# Patient Record
Sex: FEMALE | Race: WHITE
[De-identification: names, ages, dates, MRNs, and addresses within clinical notes are randomized per-mention and may not be internally consistent; named-entity substitution may affect disease eponyms.]

---

## 2017-11-02 ENCOUNTER — RX ONLY (RX ONLY)
Age: 42
End: 2017-11-02

## 2017-11-02 ENCOUNTER — DOCTOR'S OFFICE (OUTPATIENT)
Dept: URBAN - METROPOLITAN AREA CLINIC 125 | Facility: CLINIC | Age: 42
Setting detail: OPHTHALMOLOGY
End: 2017-11-02
Payer: COMMERCIAL

## 2017-11-02 DIAGNOSIS — H10.11: ICD-10-CM

## 2017-11-02 DIAGNOSIS — S05.01XA: ICD-10-CM

## 2017-11-02 PROCEDURE — 92002 INTRM OPH EXAM NEW PATIENT: CPT | Performed by: OPTOMETRIST

## 2017-11-02 ASSESSMENT — REFRACTION_MANIFEST
OD_VA1: 20/
OD_VA3: 20/
OS_VA2: 20/
OS_VA2: 20/
OD_VA3: 20/
OU_VA: 20/
OD_VA2: 20/
OS_VA2: 20/
OD_VA1: 20/
OD_VA1: 20/
OU_VA: 20/
OS_VA1: 20/
OD_VA2: 20/
OS_VA1: 20/
OD_VA3: 20/
OS_VA1: 20/
OS_VA3: 20/
OS_VA3: 20/
OD_VA2: 20/
OU_VA: 20/
OS_VA3: 20/

## 2017-11-02 ASSESSMENT — REFRACTION_CURRENTRX
OS_OVR_VA: 20/
OD_OVR_VA: 20/
OS_SPHERE: -5.75
OS_OVR_VA: 20/
OD_OVR_VA: 20/
OD_VPRISM_DIRECTION: SV
OD_SPHERE: -5.25
OS_AXIS: 074
OS_VPRISM_DIRECTION: SV
OD_CYLINDER: -1.00
OS_OVR_VA: 20/
OD_OVR_VA: 20/
OD_AXIS: 092
OS_CYLINDER: -0.75

## 2017-11-02 ASSESSMENT — VISUAL ACUITY
OD_BCVA: 20/20
OS_BCVA: 20/60+2

## 2017-11-07 ENCOUNTER — DOCTOR'S OFFICE (OUTPATIENT)
Dept: URBAN - METROPOLITAN AREA CLINIC 125 | Facility: CLINIC | Age: 42
Setting detail: OPHTHALMOLOGY
End: 2017-11-07
Payer: COMMERCIAL

## 2017-11-07 DIAGNOSIS — S05.01XD: ICD-10-CM

## 2017-11-07 DIAGNOSIS — H10.11: ICD-10-CM

## 2017-11-07 PROCEDURE — 92012 INTRM OPH EXAM EST PATIENT: CPT | Performed by: OPTOMETRIST

## 2017-11-07 ASSESSMENT — REFRACTION_CURRENTRX
OD_OVR_VA: 20/
OS_OVR_VA: 20/
OS_SPHERE: -5.75
OD_OVR_VA: 20/
OS_OVR_VA: 20/
OS_VPRISM_DIRECTION: SV
OS_AXIS: 074
OD_CYLINDER: -1.00
OS_OVR_VA: 20/
OD_AXIS: 092
OS_CYLINDER: -0.75
OD_VPRISM_DIRECTION: SV
OD_SPHERE: -5.25
OD_OVR_VA: 20/

## 2017-11-07 ASSESSMENT — REFRACTION_MANIFEST
OS_VA1: 20/
OD_VA2: 20/
OD_VA2: 20/
OD_VA1: 20/
OS_VA3: 20/
OU_VA: 20/
OS_VA3: 20/
OU_VA: 20/
OD_VA2: 20/
OD_VA3: 20/
OS_VA3: 20/
OS_VA1: 20/
OD_VA1: 20/
OS_VA2: 20/
OU_VA: 20/
OD_VA3: 20/
OS_VA1: 20/
OS_VA2: 20/
OD_VA3: 20/
OD_VA1: 20/
OS_VA2: 20/

## 2017-11-07 ASSESSMENT — VISUAL ACUITY
OS_BCVA: 20/25-2
OD_BCVA: 20/25+1

## 2017-11-07 ASSESSMENT — DRY EYES - PHYSICIAN NOTES
OS_GENERALCOMMENTS: ETFBUT 5 SEC
OD_GENERALCOMMENTS: ETFBUT 5 SEC

## 2018-07-13 ENCOUNTER — RX ONLY (RX ONLY)
Age: 43
End: 2018-07-13

## 2018-07-13 ENCOUNTER — DOCTOR'S OFFICE (OUTPATIENT)
Dept: URBAN - METROPOLITAN AREA CLINIC 125 | Facility: CLINIC | Age: 43
Setting detail: OPHTHALMOLOGY
End: 2018-07-13
Payer: COMMERCIAL

## 2018-07-13 DIAGNOSIS — H52.13: ICD-10-CM

## 2018-07-13 PROBLEM — S05.01XD CORNEAL ABRASION; RIGHT EYE SUBSEQUENT ENCOUNTER: Status: RESOLVED | Noted: 2017-11-07 | Resolved: 2018-07-13

## 2018-07-13 PROBLEM — H10.11 ALLERGIC CONJUNCTIVITIS; RIGHT EYE: Status: RESOLVED | Noted: 2017-11-02 | Resolved: 2018-07-13

## 2018-07-13 PROCEDURE — 92014 COMPRE OPH EXAM EST PT 1/>: CPT | Performed by: OPTOMETRIST

## 2018-07-13 PROCEDURE — 92015 DETERMINE REFRACTIVE STATE: CPT | Performed by: OPTOMETRIST

## 2018-07-13 ASSESSMENT — REFRACTION_CURRENTRX
OS_OVR_VA: 20/
OS_VPRISM_DIRECTION: SV
OD_OVR_VA: 20/
OS_CYLINDER: -0.75
OS_AXIS: 074
OS_OVR_VA: 20/
OD_CYLINDER: -1.00
OD_OVR_VA: 20/
OD_OVR_VA: 20/
OS_SPHERE: -5.75
OD_SPHERE: -5.25
OS_OVR_VA: 20/
OD_AXIS: 092
OD_VPRISM_DIRECTION: SV

## 2018-07-13 ASSESSMENT — REFRACTION_OUTSIDERX
OD_VA2: 20/
OD_VA1: 20/20
OD_AXIS: 090
OD_VA3: 20/
OS_VA3: 20/
OS_CYLINDER: -0.75
OS_AXIS: 075
OU_VA: 20/20
OD_CYLINDER: -0.25
OS_VA2: 20/
OS_SPHERE: -6.00
OD_SPHERE: -6.00
OS_VA1: 20/20

## 2018-07-13 ASSESSMENT — REFRACTION_AUTOREFRACTION
OS_AXIS: 066
OD_SPHERE: -5.25
OS_SPHERE: -5.25
OD_CYLINDER: 0.00
OD_AXIS: 180
OS_CYLINDER: -0.25

## 2018-07-13 ASSESSMENT — REFRACTION_MANIFEST
OS_VA3: 20/
OS_VA1: 20/
OS_VA1: 20/
OD_VA3: 20/
OS_VA2: 20/
OD_VA1: 20/
OS_VA3: 20/
OU_VA: 20/
OD_VA2: 20/
OS_VA2: 20/
OD_VA2: 20/
OD_VA3: 20/
OU_VA: 20/
OD_VA1: 20/

## 2018-07-13 ASSESSMENT — AXIALLENGTH_DERIVED
OS_AL: 25.0345
OD_AL: 25.0314

## 2018-07-13 ASSESSMENT — CONFRONTATIONAL VISUAL FIELD TEST (CVF)
OS_FINDINGS: FULL
OD_FINDINGS: FULL

## 2018-07-13 ASSESSMENT — SPHEQUIV_DERIVED
OD_SPHEQUIV: -5.25
OS_SPHEQUIV: -5.375

## 2018-07-13 ASSESSMENT — VISUAL ACUITY
OS_BCVA: 20/20
OD_BCVA: 20/20

## 2018-07-13 ASSESSMENT — KERATOMETRY
OD_AXISANGLE_DEGREES: 014
OD_K2POWER_DIOPTERS: 46.00
OD_K1POWER_DIOPTERS: 44.75
OS_AXISANGLE_DEGREES: 163
OS_K2POWER_DIOPTERS: 45.75
OS_K1POWER_DIOPTERS: 45.25

## 2018-07-13 ASSESSMENT — DRY EYES - PHYSICIAN NOTES
OS_GENERALCOMMENTS: ETFBUT 5 SEC
OD_GENERALCOMMENTS: ETFBUT 5 SEC

## 2021-07-12 ENCOUNTER — DOCTOR'S OFFICE (OUTPATIENT)
Dept: URBAN - METROPOLITAN AREA CLINIC 125 | Facility: CLINIC | Age: 46
Setting detail: OPHTHALMOLOGY
End: 2021-07-12
Payer: COMMERCIAL

## 2021-07-12 VITALS — HEIGHT: 55 IN

## 2021-07-12 DIAGNOSIS — H25.13: ICD-10-CM

## 2021-07-12 DIAGNOSIS — H35.372: ICD-10-CM

## 2021-07-12 DIAGNOSIS — H02.011: ICD-10-CM

## 2021-07-12 DIAGNOSIS — H04.123: ICD-10-CM

## 2021-07-12 DIAGNOSIS — H53.123: ICD-10-CM

## 2021-07-12 PROCEDURE — 67820 REVISE EYELASHES: CPT | Performed by: OPHTHALMOLOGY

## 2021-07-12 PROCEDURE — 92134 CPTRZ OPH DX IMG PST SGM RTA: CPT | Performed by: OPHTHALMOLOGY

## 2021-07-12 PROCEDURE — 92014 COMPRE OPH EXAM EST PT 1/>: CPT | Performed by: OPHTHALMOLOGY

## 2021-07-12 ASSESSMENT — REFRACTION_CURRENTRX
OS_SPHERE: -5.75
OS_AXIS: 074
OD_AXIS: 092
OS_CYLINDER: -0.75
OS_OVR_VA: 20/
OD_OVR_VA: 20/
OD_CYLINDER: -1.00
OD_SPHERE: -5.25
OS_VPRISM_DIRECTION: SV
OD_VPRISM_DIRECTION: SV

## 2021-07-12 ASSESSMENT — REFRACTION_MANIFEST
OD_CYLINDER: -0.25
OS_AXIS: 075
OD_SPHERE: -6.00
OS_CYLINDER: -0.75
OS_VA1: 20/20
OD_AXIS: 090
OS_SPHERE: -6.00
OD_VA1: 20/20
OU_VA: 20/20

## 2021-07-12 ASSESSMENT — KERATOMETRY
OS_K1POWER_DIOPTERS: 46.00
OS_K2POWER_DIOPTERS: 68.25
OD_K2POWER_DIOPTERS: 46.00
OD_AXISANGLE_DEGREES: 010
OD_K1POWER_DIOPTERS: 44.75
OS_AXISANGLE_DEGREES: 089

## 2021-07-12 ASSESSMENT — REFRACTION_AUTOREFRACTION
OS_CYLINDER: -0.75
OD_AXIS: 094
OD_CYLINDER: -0.50
OS_AXIS: 074
OD_SPHERE: -4.50
OS_SPHERE: -5.00

## 2021-07-12 ASSESSMENT — TONOMETRY
OS_IOP_MMHG: 17
OD_IOP_MMHG: 14

## 2021-07-12 ASSESSMENT — VISUAL ACUITY
OD_BCVA: 20/20-2
OS_BCVA: 20/25

## 2021-07-12 ASSESSMENT — AXIALLENGTH_DERIVED
OS_AL: 21.31
OD_AL: 24.814
OS_AL: 21
OD_AL: 25.4212

## 2021-07-12 ASSESSMENT — SPHEQUIV_DERIVED
OD_SPHEQUIV: -6.125
OS_SPHEQUIV: -6.375
OS_SPHEQUIV: -5.375
OD_SPHEQUIV: -4.75

## 2021-07-12 ASSESSMENT — CONFRONTATIONAL VISUAL FIELD TEST (CVF)
OD_FINDINGS: FULL
OS_FINDINGS: FULL

## 2021-07-12 ASSESSMENT — MACULA - EPIRETINAL MEMBRANE (ERM): OS_ERM: T

## 2021-07-27 ENCOUNTER — DOCTOR'S OFFICE (OUTPATIENT)
Dept: URBAN - METROPOLITAN AREA CLINIC 125 | Facility: CLINIC | Age: 46
Setting detail: OPHTHALMOLOGY
End: 2021-07-27
Payer: COMMERCIAL

## 2021-07-27 DIAGNOSIS — H52.4: ICD-10-CM

## 2021-07-27 DIAGNOSIS — H52.13: ICD-10-CM

## 2021-07-27 PROCEDURE — 92015 DETERMINE REFRACTIVE STATE: CPT | Performed by: OPTOMETRIST

## 2021-07-27 PROCEDURE — 92012 INTRM OPH EXAM EST PATIENT: CPT | Performed by: OPTOMETRIST

## 2021-07-27 PROCEDURE — 92310 CONTACT LENS FITTING OU: CPT | Performed by: OPTOMETRIST

## 2021-07-27 ASSESSMENT — SPHEQUIV_DERIVED
OD_SPHEQUIV: -4.75
OD_SPHEQUIV: -5.625
OS_SPHEQUIV: -6
OS_SPHEQUIV: -5.375

## 2021-07-27 ASSESSMENT — REFRACTION_AUTOREFRACTION
OD_CYLINDER: -0.50
OS_SPHERE: -5.00
OS_AXIS: 074
OS_CYLINDER: -0.75
OD_SPHERE: -4.50
OD_AXIS: 094

## 2021-07-27 ASSESSMENT — REFRACTION_MANIFEST
OD_SPHERE: -5.50
OS_ADD: +1.00
OD_VA1: 20/20
OU_VA: 20/20
OS_SPHERE: -5.75
OS_AXIS: 075
OD_AXIS: 090
OD_ADD: +1.00
OD_CYLINDER: -0.25
OS_VA1: 20/20
OS_CYLINDER: -0.50

## 2021-07-27 ASSESSMENT — AXIALLENGTH_DERIVED
OD_AL: 25.197
OD_AL: 24.814
OS_AL: 21.2
OS_AL: 21

## 2021-07-27 ASSESSMENT — KERATOMETRY
OD_K1POWER_DIOPTERS: 44.75
OS_AXISANGLE_DEGREES: 089
OS_K2POWER_DIOPTERS: 68.25
OD_AXISANGLE_DEGREES: 010
OS_K1POWER_DIOPTERS: 46.00
OD_K2POWER_DIOPTERS: 46.00

## 2021-07-27 ASSESSMENT — REFRACTION_CURRENTRX
OD_VPRISM_DIRECTION: SV
OS_VPRISM_DIRECTION: SV
OS_OVR_VA: 20/
OD_SPHERE: -5.25
OD_CYLINDER: -1.00
OS_SPHERE: -5.75
OD_OVR_VA: 20/
OS_AXIS: 074
OS_CYLINDER: -0.75
OD_AXIS: 092

## 2021-07-27 ASSESSMENT — VISUAL ACUITY
OD_BCVA: 20/20
OS_BCVA: 20/20

## 2021-10-15 ENCOUNTER — OPTICAL OFFICE (OUTPATIENT)
Dept: URBAN - METROPOLITAN AREA CLINIC 134 | Facility: CLINIC | Age: 46
Setting detail: OPHTHALMOLOGY
End: 2021-10-15
Payer: COMMERCIAL

## 2021-10-15 ENCOUNTER — OPTICAL OFFICE (OUTPATIENT)
Dept: URBAN - METROPOLITAN AREA CLINIC 134 | Facility: CLINIC | Age: 46
Setting detail: OPHTHALMOLOGY
End: 2021-10-15

## 2021-10-15 ENCOUNTER — DOCTOR'S OFFICE (OUTPATIENT)
Dept: URBAN - METROPOLITAN AREA CLINIC 125 | Facility: CLINIC | Age: 46
Setting detail: OPHTHALMOLOGY
End: 2021-10-15

## 2021-10-15 VITALS — HEIGHT: 55 IN

## 2021-10-15 DIAGNOSIS — H52.13: ICD-10-CM

## 2021-10-15 DIAGNOSIS — H52.223: ICD-10-CM

## 2021-10-15 PROBLEM — H25.13 CATARACT NUCLEAR SCLEROSIS; BOTH EYES: Status: ACTIVE | Noted: 2021-07-12

## 2021-10-15 PROBLEM — H04.123 DRY EYE; BOTH EYES: Status: ACTIVE | Noted: 2017-11-07

## 2021-10-15 PROBLEM — H53.123 SUBJECTIVE VISUAL DISTURBANCE; BOTH EYES: Status: ACTIVE | Noted: 2021-07-12

## 2021-10-15 PROBLEM — H02.011 TRICHIASIS; RIGHT UPPER LID: Status: ACTIVE | Noted: 2021-07-12

## 2021-10-15 PROBLEM — H35.372 ERM; LEFT EYE: Status: ACTIVE | Noted: 2021-07-12

## 2021-10-15 PROCEDURE — NCRX N/C GLASSES RX: Performed by: OPTOMETRIST

## 2021-10-15 PROCEDURE — V2020 VISION SVCS FRAMES PURCHASES: HCPCS | Performed by: OPTOMETRIST

## 2021-10-15 PROCEDURE — S0500 DISPOS CONT LENS: HCPCS | Performed by: OPTOMETRIST

## 2021-10-15 PROCEDURE — V2107 SPHEROCYLINDER 4.25D/12-2D: HCPCS | Performed by: OPTOMETRIST

## 2021-10-15 ASSESSMENT — SPHEQUIV_DERIVED
OS_SPHEQUIV: -6
OD_SPHEQUIV: -5.625
OS_SPHEQUIV: -5.375
OD_SPHEQUIV: -4.75

## 2021-10-15 ASSESSMENT — REFRACTION_MANIFEST
OS_AXIS: 075
OD_SPHERE: -5.50
OS_CYLINDER: -0.50
OD_VA1: 20/20
OD_CYLINDER: -0.25
OS_SPHERE: -5.75
OS_VA1: 20/20
OS_ADD: +1.00
OU_VA: 20/20
OD_AXIS: 090
OD_ADD: +1.00

## 2021-10-15 ASSESSMENT — REFRACTION_CURRENTRX
OD_VPRISM_DIRECTION: SV
OD_SPHERE: -5.25
OS_SPHERE: -5.75
OS_CYLINDER: -0.75
OS_OVR_VA: 20/
OD_AXIS: 092
OD_CYLINDER: -1.00
OS_AXIS: 074
OD_OVR_VA: 20/
OS_VPRISM_DIRECTION: SV

## 2021-10-15 ASSESSMENT — KERATOMETRY
OS_AXISANGLE_DEGREES: 089
OD_AXISANGLE_DEGREES: 010
OD_K2POWER_DIOPTERS: 46.00
OS_K2POWER_DIOPTERS: 68.25
OS_K1POWER_DIOPTERS: 46.00
OD_K1POWER_DIOPTERS: 44.75

## 2021-10-15 ASSESSMENT — REFRACTION_AUTOREFRACTION
OS_AXIS: 074
OD_AXIS: 094
OD_SPHERE: -4.50
OS_CYLINDER: -0.75
OD_CYLINDER: -0.50
OS_SPHERE: -5.00

## 2021-10-15 ASSESSMENT — AXIALLENGTH_DERIVED
OD_AL: 24.814
OS_AL: 21.2
OS_AL: 21
OD_AL: 25.197

## 2021-10-15 ASSESSMENT — VISUAL ACUITY
OD_BCVA: 20/20
OS_BCVA: 20/20

## 2023-11-28 ENCOUNTER — OFFICE VISIT (OUTPATIENT)
Age: 48
End: 2023-11-28
Payer: MEDICARE

## 2023-11-28 VITALS
RESPIRATION RATE: 18 BRPM | OXYGEN SATURATION: 98 % | HEART RATE: 116 BPM | SYSTOLIC BLOOD PRESSURE: 102 MMHG | DIASTOLIC BLOOD PRESSURE: 77 MMHG | WEIGHT: 121.25 LBS | TEMPERATURE: 98.4 F | HEIGHT: 62 IN | BODY MASS INDEX: 22.31 KG/M2

## 2023-11-28 DIAGNOSIS — J20.9 ACUTE BRONCHITIS, UNSPECIFIED ORGANISM: Primary | ICD-10-CM

## 2023-11-28 PROCEDURE — 99203 OFFICE O/P NEW LOW 30 MIN: CPT | Performed by: PHYSICIAN ASSISTANT

## 2023-11-28 RX ORDER — MAGNESIUM GLUCONATE 30 MG(550)
1 TABLET ORAL EVERY MORNING
COMMUNITY

## 2023-11-28 RX ORDER — AZITHROMYCIN 250 MG/1
TABLET, FILM COATED ORAL
Qty: 6 TABLET | Refills: 0 | Status: SHIPPED | OUTPATIENT
Start: 2023-11-28 | End: 2023-12-02

## 2023-11-28 RX ORDER — OXYBUTYNIN CHLORIDE 5 MG/1
5 TABLET ORAL 3 TIMES DAILY
COMMUNITY
Start: 2023-11-07 | End: 2024-11-06

## 2023-11-28 RX ORDER — POLYETHYLENE GLYCOL 3350 17 G/17G
17 POWDER, FOR SOLUTION ORAL DAILY
COMMUNITY

## 2023-11-28 RX ORDER — BENZONATATE 100 MG/1
100 CAPSULE ORAL 3 TIMES DAILY PRN
Qty: 20 CAPSULE | Refills: 0 | Status: SHIPPED | OUTPATIENT
Start: 2023-11-28

## 2023-11-28 NOTE — PATIENT INSTRUCTIONS
Acute Bronchitis   WHAT YOU NEED TO KNOW:   Acute bronchitis is swelling and irritation in your lungs. It is usually caused by a virus and most often happens in the winter. Bronchitis may also be caused by bacteria or by a chemical irritant, such as smoke. DISCHARGE INSTRUCTIONS:   Return to the emergency department if:   You cough up blood. Your lips or fingernails turn blue. You feel like you are not getting enough air when you breathe. Call your doctor if:   Your symptoms do not go away or get worse, even after treatment. Your cough does not get better within 4 weeks. You have questions or concerns about your condition or care. Medicines: You may need any of the following:  Cough suppressants  decrease your urge to cough. Decongestants  help loosen mucus in your lungs and make it easier to cough up. This can help you breathe easier. Inhalers  may be given. Your healthcare provider may give you one or more inhalers to help you breathe easier and cough less. An inhaler gives you medicine to open your airways. Ask your healthcare provider to show you how to use your inhaler correctly. Antiviral medicine  treats infections caused by a virus. Antibiotics  may be given if your bronchitis is caused by bacteria or if you have lung condition. Acetaminophen  decreases pain and fever. It is available without a doctor's order. Ask how much to take and how often to take it. Follow directions. Read the labels of all other medicines you are using to see if they also contain acetaminophen, or ask your doctor or pharmacist. Acetaminophen can cause liver damage if not taken correctly. NSAIDs  help decrease swelling and pain or fever. This medicine is available with or without a doctor's order. NSAIDs can cause stomach bleeding or kidney problems in certain people. If you take blood thinner medicine, always ask your healthcare provider if NSAIDs are safe for you.  Always read the medicine label and follow directions. Self-care:   Drink liquids as directed. You may need to drink more liquids than usual to stay hydrated. Ask how much liquid to drink each day and which liquids are best for you. Use a cool mist humidifier. This increases air moisture in your home. This may make it easier for you to breathe and help decrease your cough. Get more rest.  Rest helps your body to heal. Slowly start to do more each day. Rest when you feel it is needed. Prevent acute bronchitis:       Ask about vaccines you may need. Get a flu vaccine each year as soon as recommended, usually in September or October. Ask your healthcare provider if you should also get a pneumonia or COVID-19 vaccine. Your healthcare provider can tell you if you should also get other vaccines, and when to get them. Prevent the spread of germs. You can decrease your risk for acute bronchitis and other illnesses by doing the following:     Wash your hands often with soap and water. Carry germ-killing hand lotion or gel with you. You can use the lotion or gel to clean your hands when soap and water are not available. Do not touch your eyes, nose, or mouth unless you have washed your hands first.    Always cover your mouth when you cough to prevent the spread of germs. It is best to cough into a tissue or your shirt sleeve instead of into your hand. Ask those around you to cover their mouths when they cough. Try to avoid people who have a cold or the flu. If you are sick, stay away from others as much as possible. Avoid irritants in the air. Avoid chemicals, fumes, and dust. Wear a face mask if you must work around dust or fumes. Stay inside on days when air pollution levels are high. If you have allergies, stay inside when pollen counts are high. Do not use aerosol products, such as spray-on deodorant, bug spray, and hair spray. Do not smoke or be around others who are smoking.   Nicotine and other chemicals in cigarettes and cigars can cause lung damage. Ask your healthcare provider for information if you currently smoke and need help to quit. E-cigarettes or smokeless tobacco still contain nicotine. Talk to your healthcare provider before you use these products. Follow up with your doctor as directed:  Write down questions you have so you will remember to ask them during your follow-up visits. © Copyright [facility] 2023 Information is for End User's use only and may not be sold, redistributed or otherwise used for commercial purposes. The above information is an  only. It is not intended as medical advice for individual conditions or treatments. Talk to your doctor, nurse or pharmacist before following any medical regimen to see if it is safe and effective for you.

## 2023-12-04 NOTE — PROGRESS NOTES
[facility]        NAME: Delon Wooten is a 50 y.o. female  : 1975    MRN: [id number]  DATE: 2023  TIME: 9:05 PM    Assessment and Plan   Acute bronchitis, unspecified organism [J20.9]  1. Acute bronchitis, unspecified organism  azithromycin (ZITHROMAX) 250 mg tablet    benzonatate (TESSALON PERLES) 100 mg capsule            Patient Instructions     Pt has bronchitis which I will treat with a combination of a Z-pack and Tessalon Perles and rec hydration, rest, discussed OTC cough meds, close observation. Follow up with PCP in 3-5 days. Proceed to  ER if symptoms worsen. Chief Complaint     Chief Complaint   Patient presents with    Cold Like Symptoms     Sore throat x 1 week, fatigue and SOB. Mild brain fog. Denies use of OTC medications. History of Present Illness       Pt presents with a 1 wk hx of cough, SOB, chest tightness, ST, fatigue, feels "foggy." Denies significant head congestion, fever, chills, NVD, recent COVID exposure. Has not been taking anything OTC for symptoms. Review of Systems   Review of Systems   Constitutional:  Positive for fatigue. Negative for chills and fever. HENT:  Positive for sore throat. Negative for congestion. Respiratory:  Positive for cough, chest tightness and shortness of breath. Cardiovascular: Negative. Gastrointestinal: Negative. Genitourinary: Negative.           Current Medications       Current Outpatient Medications:     benzonatate (TESSALON PERLES) 100 mg capsule, Take 1 capsule (100 mg total) by mouth 3 (three) times a day as needed for cough, Disp: 20 capsule, Rfl: 0    Magnesium Gluconate 550 MG TABS, Take 1 tablet by mouth every morning, Disp: , Rfl:     oxybutynin (DITROPAN) 5 mg tablet, Take 5 mg by mouth Three times a day, Disp: , Rfl:     polyethylene glycol (GLYCOLAX) 17 GM/SCOOP powder, Take 17 g by mouth daily, Disp: , Rfl:     Current Allergies     Allergies as of 2023 - Reviewed 11/28/2023   Allergen Reaction Noted    Caffeine - food allergy Other (See Comments) 11/28/2023    Cheese - food allergy Other (See Comments) 11/28/2023    Codeine Angioedema, Anaphylaxis, and Shortness Of Breath 08/17/2015    Garrett blue Shortness Of Breath and Hypertension 08/21/2015    Gluten meal - food allergy Anaphylaxis 05/22/2019    Latex Anaphylaxis 07/25/2018    Medical tape Rash 06/01/2021    Nickel Rash 11/17/2021            The following portions of the patient's history were reviewed and updated as appropriate: allergies, current medications, past family history, past medical history, past social history, past surgical history and problem list.     History reviewed. No pertinent past medical history. Past Surgical History:   Procedure Laterality Date    A-V CARDIAC PACEMAKER INSERTION         History reviewed. No pertinent family history. Medications have been verified. Objective   /77   Pulse (!) 116   Temp 98.4 °F (36.9 °C)   Resp 18   Ht 5' 2" (1.575 m)   Wt 55 kg (121 lb 4.1 oz)   LMP 11/14/2023 (Approximate)   SpO2 98%   BMI 22.18 kg/m²   Patient's last menstrual period was 11/14/2023 (approximate). Physical Exam     Physical Exam  Vitals reviewed. Constitutional:       General: She is not in acute distress. Appearance: She is well-developed. HENT:      Right Ear: Tympanic membrane, ear canal and external ear normal.      Left Ear: Tympanic membrane, ear canal and external ear normal.      Nose: Nose normal.      Mouth/Throat:      Mouth: Mucous membranes are moist.      Pharynx: Posterior oropharyngeal erythema present. No oropharyngeal exudate. Tonsils: No tonsillar exudate. Cardiovascular:      Rate and Rhythm: Normal rate and regular rhythm. Pulses: Normal pulses. Heart sounds: Normal heart sounds. No murmur heard. Pulmonary:      Effort: Pulmonary effort is normal. No respiratory distress.       Breath sounds: Rhonchi (B/L diffuse coarse breath sounds heard throughout) present. No wheezing. Musculoskeletal:      Cervical back: Neck supple. Lymphadenopathy:      Cervical: No cervical adenopathy. Neurological:      Mental Status: She is alert and oriented to person, place, and time.

## 2025-04-22 ENCOUNTER — OFFICE VISIT (OUTPATIENT)
Age: 50
End: 2025-04-22
Payer: MEDICARE

## 2025-04-22 VITALS
BODY MASS INDEX: 23 KG/M2 | RESPIRATION RATE: 18 BRPM | OXYGEN SATURATION: 99 % | SYSTOLIC BLOOD PRESSURE: 102 MMHG | HEIGHT: 62 IN | TEMPERATURE: 98.5 F | DIASTOLIC BLOOD PRESSURE: 54 MMHG | HEART RATE: 57 BPM | WEIGHT: 125 LBS

## 2025-04-22 DIAGNOSIS — S60.419A ABRASION OF FINGER, INITIAL ENCOUNTER: ICD-10-CM
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
## 2025-04-22 PROCEDURE — 99213 OFFICE O/P EST LOW 20 MIN: CPT

## 2025-04-22 RX ORDER — DOXYCYCLINE 100 MG/1
200 CAPSULE ORAL ONCE
Qty: 2 CAPSULE | Refills: 0 | Status: SHIPPED | OUTPATIENT
Start: 2025-04-22 | End: 2025-04-22

## 2025-04-22 NOTE — PROGRESS NOTES
[facility]        NAME: Talya Armstrong is a 50 y.o. female  : 1975    MRN: [id number]  DATE: 2025  TIME: 4:33 PM    Assessment and Plan   Tick bite, unspecified site, initial encounter [W57.XXXA]  1. Tick bite, unspecified site, initial encounter  doxycycline monohydrate (MONODOX) 100 mg capsule    DISCONTINUED: doxycycline monohydrate (MONODOX) 100 mg capsule      2. Abrasion of finger, initial encounter        Recommended to keep clean and dry. Take doxy with full cup of water and avoid sun exposure while on medication. Advised to watch for signs and symptoms of lyme. Discussed with patient that there are no foreign bodies in her left middle finger, just an abrasion. Advised to keep clean and dry and watch for signs of infection. Follow up with PCP. ED if symptoms worsen.   Patient Instructions     Follow up with PCP in 3-5 days.  Proceed to ER if symptoms worsen.    If tests have been performed at [facility], our office will contact you with results if changes need to be made to the care plan discussed with you at the visit.  [facility].    Chief Complaint     Chief Complaint   Patient presents with    Tick Removal     This morning she noticed a tick in her left underarm. She believed it got attached yesterday while outside. It is still attached to her underarm. She has history of Lyme disease. Additionally she was working in the yard and picked up a thorny stick and hurt her left middle finger. She reports sharp pain. She isn't sure if there is something in there.     History of Present Illness     Pt is a 50-year-old female presenting for a tick bite under the left arm x 1 day.   She states it started when she was working in the yard and found a tick this morning. She believes it attached yesterday.  She has tried nothing so far.  Additionally she complains of left middle finger pain after picking up a thorny stick yesterday. She notes  she is unsure if anything is still in there.  Denies any other symptoms at this time.         Review of Systems   Review of Systems   Constitutional:  Negative for chills, fatigue and fever.   HENT: Negative.     Respiratory:  Negative for cough, shortness of breath and wheezing.    Gastrointestinal:  Negative for nausea and vomiting.   Musculoskeletal:  Negative for arthralgias and myalgias.   Neurological: Negative.  Negative for headaches.     Current Medications       Current Outpatient Medications:     doxycycline monohydrate (MONODOX) 100 mg capsule, Take 2 capsules (200 mg total) by mouth 1 (one) time for 1 dose, Disp: 2 capsule, Rfl: 0    FLUoxetine (PROzac) 20 mg capsule, Take 1 capsule by mouth daily, Disp: , Rfl:     oxybutynin (DITROPAN) 5 mg tablet, Take 5 mg by mouth Three times a day, Disp: , Rfl:     benzonatate (TESSALON PERLES) 100 mg capsule, Take 1 capsule (100 mg total) by mouth 3 (three) times a day as needed for cough, Disp: 20 capsule, Rfl: 0    polyethylene glycol (GLYCOLAX) 17 GM/SCOOP powder, Take 17 g by mouth daily, Disp: , Rfl:     Current Allergies     Allergies as of 04/22/2025 - Reviewed 04/22/2025   Allergen Reaction Noted    Caffeine - food allergy Other (See Comments) 11/28/2023    Cheese - food allergy Other (See Comments) 11/28/2023    Codeine Angioedema, Anaphylaxis, and Shortness Of Breath 08/17/2015    Garrett blue Shortness Of Breath and Hypertension 08/21/2015    Gluten meal - food allergy Anaphylaxis 05/22/2019    Latex Anaphylaxis 07/25/2018    Medical tape Rash 06/01/2021    Nickel Rash 11/17/2021            The following portions of the patient's history were reviewed and updated as appropriate: allergies, current medications, past family history, past medical history, past social history, past surgical history and problem list.     History reviewed. No pertinent past medical history.    Past Surgical History:   Procedure Laterality Date    A-V CARDIAC PACEMAKER INSERTION  "        History reviewed. No pertinent family history.      Medications have been verified.        Objective   /54 (BP Location: Left arm, Patient Position: Sitting, Cuff Size: Standard)   Pulse 57   Temp 98.5 °F (36.9 °C) (Oral)   Resp 18   Ht 5' 2\" (1.575 m)   Wt 56.7 kg (125 lb)   LMP 11/14/2023 (Approximate)   SpO2 99%   BMI 22.86 kg/m²   Patient's last menstrual period was 11/14/2023 (approximate).       Physical Exam     Physical Exam  Vitals and nursing note reviewed.   Constitutional:       General: She is not in acute distress.     Appearance: Normal appearance. She is normal weight. She is not ill-appearing, toxic-appearing or diaphoretic.   HENT:      Head: Normocephalic and atraumatic.      Right Ear: External ear normal.      Left Ear: External ear normal.      Nose: Nose normal.      Mouth/Throat:      Mouth: Mucous membranes are moist.   Eyes:      Conjunctiva/sclera: Conjunctivae normal.   Cardiovascular:      Rate and Rhythm: Normal rate.      Pulses: Normal pulses.   Pulmonary:      Effort: Pulmonary effort is normal.      Breath sounds: Normal breath sounds.   Musculoskeletal:         General: No swelling. Normal range of motion.      Cervical back: Normal range of motion.   Skin:     General: Skin is warm and dry.      Capillary Refill: Capillary refill takes less than 2 seconds.      Comments: Small abrasion on palmar aspect of left middle finger without erythema, swelling, drainage   Neurological:      General: No focal deficit present.      Mental Status: She is alert. Mental status is at baseline.   Psychiatric:         Mood and Affect: Mood normal.         Behavior: Behavior normal.                   "

## 2025-04-22 NOTE — PATIENT INSTRUCTIONS
"Patient Education     Insect bites and stings   The Basics   Written by the doctors and editors at [facility]   How are insect bites and stings different? -- When an insect bites you, it uses its mouth parts. When an insect stings you, it uses a special \"stinger\" on the back of its body.   Biting insects, like mosquitoes and ticks, can transfer blood from other people and animals that they've bitten on to you. Some diseases and infections can be spread this way.   Stinging insects, like bees, wasps, and fire ants, do not usually carry disease. But stinging insects can inject you with \"venom.\" This can irritate your skin. Plus, a sting can be deadly to people who are severely allergic to the insect venom.  What is a normal reaction to an insect sting? -- Insect stings can cause the area around the sting to swell, turn red, hurt, and feel hot (picture 1).  To treat the pain and swelling around the area of the sting, you can:   Wash the area with soap and cool water.   Keep the area clean, and try not to scratch it.   Put a cold, damp washcloth on the area.   Take or apply anti-itch medicine.   Take a non-prescription pain medicine for the pain.  The reaction usually gets better in an hour or 2. But for some people, swelling around the sting can last for days. This is called a \"large local reaction.\" It is not dangerous, and it is not the same as an allergic reaction.  Some people do have a severe allergic reaction to insect stings. This is called \"anaphylaxis.\" Signs include hives, swelling, and trouble breathing.  What should I know about tick bites? -- Ticks are found in the grass and on shrubs, and can attach to people walking by. One type of tick can spread Lyme disease. But a tick has to stay attached for a while before it can give you the infection.  If you are bitten by a tick, gently remove the tick from your skin using tweezers. Save the tick by sealing it in a piece of clear tape. If you can't save it, try to " remember its color and size. This can help your doctor or nurse figure out if it might be the type of tick that carries Lyme disease.  Call your doctor or nurse if:   You cannot remove a tick from yourself or your child.   You get a fever or rash within the next few weeks.   You think that you have had a tick attached for at least 36 hours (a day and a half).  Your doctor or nurse can then decide if you need to take a dose of an antibiotic to help prevent Lyme. Doctors only recommend antibiotics to prevent Lyme disease in some situations. It depends on your age, where you live, what kind of tick bit you, and how long it was attached.  What can I do to lower my chances of getting bitten or stung? -- You can:   Wear shoes, long-sleeved shirts, and long pants when you go outside. If you are worried about ticks, tuck your pants into your socks and wear light colors so you can spot any ticks that get on you.   Wear bug spray.   Stay inside at waqar and dusk, when mosquitoes are most active.   Keep your windows closed. If you do open them, make sure that they have screens.   Drain areas of standing water near your home, such as wading pools and buckets. Mosquitoes breed in standing water.   Keep foods and drinks covered when you are outside.   If you see a stinging insect, stay calm and slowly back away.   If you live in an area that has fire ants, avoid stepping on ant mounds.   If you find an insect nest in or near your house, call a pest control service to get rid of the nest safely.   Treat your pets and home for fleas, if needed. Ask your pet's  for advice on how to do this.  What should I do if I am stung by a bee, wasp, or fire ant? -- If you are stung by a bee or wasp, quickly remove the stinger from your skin if it is still there. If you are stung by a fire ant, kill the ant with a slap as soon as you feel the sting.  Call for an ambulance (in the [place] and [place], call 9-1-1) if you:   Have trouble  breathing, become hoarse, or start wheezing (hearing a whistling sound when you breathe)   Start to swell, especially around the face, eyelids, ears, mouth, hands, or feet   Have belly cramps, nausea, vomiting, or diarrhea   Feel dizzy or pass out  All topics are updated as new evidence becomes available and our peer review process is complete.  This topic retrieved from [employer] on: Feb 26, 2024.  Topic 09411 Version 12.0  Release: 32.2.4 - C32.56  © 2024 UpToDate, Inc. and/or its affiliates. All rights reserved.  picture 1: Insect sting     This is a normal reaction to a bee or wasp sting. There can be redness and mild, painful swelling around the spot where the stinger went in. These symptoms usually go away within a few hours.  Graphic 43876 Version 4.0  Consumer Information Use and Disclaimer   Disclaimer: This generalized information is a limited summary of diagnosis, treatment, and/or medication information. It is not meant to be comprehensive and should be used as a tool to help the user understand and/or assess potential diagnostic and treatment options. It does NOT include all information about conditions, treatments, medications, side effects, or risks that may apply to a specific patient. It is not intended to be medical advice or a substitute for the medical advice, diagnosis, or treatment of a health care provider based on the health care provider's examination and assessment of a patient's specific and unique circumstances. Patients must speak with a health care provider for complete information about their health, medical questions, and treatment options, including any risks or benefits regarding use of medications. This information does not endorse any treatments or medications as safe, effective, or approved for treating a specific patient. Up[employer]Date, Inc. and its affiliates disclaim any warranty or liability relating to this information or the use thereof.The use of this information is governed by  the Terms of Use, available at https://www.[employer].com/en/know/clinical-effectiveness-terms. 2024© [employer] and its affiliates and/or licensors. All rights reserved.  Copyright   © 2024 [employer] and/or its affiliates. All rights reserved.